# Patient Record
Sex: FEMALE | Race: WHITE
[De-identification: names, ages, dates, MRNs, and addresses within clinical notes are randomized per-mention and may not be internally consistent; named-entity substitution may affect disease eponyms.]

---

## 2020-01-14 ENCOUNTER — HOSPITAL ENCOUNTER (OUTPATIENT)
Dept: HOSPITAL 75 - RAD FS | Age: 50
End: 2020-01-14
Attending: NURSE PRACTITIONER
Payer: COMMERCIAL
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
## 2020-01-14 DIAGNOSIS — S49.91XA: Primary | ICD-10-CM

## 2020-01-14 PROCEDURE — 73030 X-RAY EXAM OF SHOULDER: CPT

## 2020-01-14 NOTE — DIAGNOSTIC IMAGING REPORT
INDICATION: Right shoulder pain after bowling injury.



COMPARISON: None available.



TECHNIQUE: Two views of the right shoulder were obtained.



FINDINGS: No fracture. Glenohumeral and acromioclavicular joints

are normal in alignment. Subacromial space is preserved. No

abnormal soft tissue mineralizations.



IMPRESSION: Normal right shoulder radiographs.



Dictated by: 



  Dictated on workstation # [id number]

## 2020-12-12 ENCOUNTER — HOSPITAL ENCOUNTER (EMERGENCY)
Dept: HOSPITAL 75 - ER FS | Age: 50
End: 2020-12-12
Payer: COMMERCIAL

## 2020-12-12 VITALS — WEIGHT: 243.61 LBS | HEIGHT: 66.02 IN | BODY MASS INDEX: 39.15 KG/M2

## 2020-12-12 VITALS — DIASTOLIC BLOOD PRESSURE: 82 MMHG | SYSTOLIC BLOOD PRESSURE: 112 MMHG

## 2020-12-12 DIAGNOSIS — N39.0: Primary | ICD-10-CM

## 2020-12-12 LAB
APTT PPP: (no result) S
BACTERIA #/AREA URNS HPF: (no result) /HPF
BILIRUB UR QL STRIP: (no result)
FIBRINOGEN PPP-MCNC: (no result) MG/DL
GLUCOSE UR STRIP-MCNC: NEGATIVE MG/DL
KETONES UR QL STRIP: (no result)
LEUKOCYTE ESTERASE UR QL STRIP: (no result)
NITRITE UR QL STRIP: POSITIVE
PH UR STRIP: 6 [PH] (ref 5–9)
PROT UR QL STRIP: (no result)
RBC #/AREA URNS HPF: (no result) /HPF
SP GR UR STRIP: 1.02 (ref 1.02–1.02)
SQUAMOUS #/AREA URNS HPF: (no result) /HPF
WBC #/AREA URNS HPF: (no result) /HPF

## 2020-12-12 PROCEDURE — 99283 EMERGENCY DEPT VISIT LOW MDM: CPT

## 2020-12-12 PROCEDURE — 87088 URINE BACTERIA CULTURE: CPT

## 2020-12-12 PROCEDURE — 87186 SC STD MICRODIL/AGAR DIL: CPT

## 2020-12-12 PROCEDURE — 81000 URINALYSIS NONAUTO W/SCOPE: CPT

## 2020-12-12 PROCEDURE — 87077 CULTURE AEROBIC IDENTIFY: CPT

## 2020-12-12 NOTE — ED GU-FEMALE
General


Chief Complaint:   - Urinary


Stated Complaint:  LOWER BACK PAIN/BURNING ON URINATION


Source:  patient





History of Present Illness


Date Seen by Provider:  Dec 12, 2020


Time Seen by Provider:  10:00


Initial Comments


50-year-old female presents with burning on urination for the past 3 days, 

associated low back discomfort.  Denies fever, chills, abdominal pain, nausea or

vomiting.  History of UTIs in the past.





Allergies and Home Medications


Allergies


Coded Allergies:  


     No Known Drug Allergies (Unverified , 12/12/20)





Patient Home Medication List


Home Medication List Reviewed:  Yes





Review of Systems


Review of Systems


Constitutional:  No chills, No fever, No malaise, No weakness


Respiratory:  no symptoms reported


Cardiovascular:  no symptoms reported


Gastrointestinal:  No abdominal pain, No nausea, No vomiting


Genitourinary:  burning, dysuria, frequency; denies hematuria; pain, urgency


Musculoskeletal:  back pain (b/l low back)





Past Medical-Social-Family Hx


Past Med/Social Hx:  Reviewed Nursing Past Med/Soc Hx


Patient Social History


Recent Foreign Travel:  No


Contact w/Someone Who Travel:  No





Physical Exam


Vital Signs





Vital Signs - First Documented








 12/12/20





 21:49


 


Temp 37.7


 


Pulse 117


 


Resp 18


 


B/P (MAP) 112/82 (92)


 


O2 Delivery Room Air





Capillary Refill :


Height, Weight, BMI


Height: '"


Weight: lbs. oz. kg;  BMI


Method:


General Appearance:  WD/WN, no apparent distress


Gastrointestinal:  non tender, soft, no organomegaly; No distended, No guarding,

No rebound, No tenderness


Back:  no CVA tenderness, no vertebral tenderness





Progress/Results/Core Measures


Suspected Sepsis


SIRS


Temperature: 


Pulse:  


Respiratory Rate: 


 


Blood Pressure  / 


Mean:





Results/Orders


Lab Results





Laboratory Tests








Test


 12/12/20


21:50 Range/Units


 


 


Urine Color DARK YELLOW   


 


Urine Clarity CLOUDY   


 


Urine pH 6.0  5-9  


 


Urine Specific Gravity 1.025 H 1.016-1.022  


 


Urine Protein 2+ H NEGATIVE  


 


Urine Glucose (UA) NEGATIVE  NEGATIVE  


 


Urine Ketones 2+ H NEGATIVE  


 


Urine Nitrite POSITIVE H NEGATIVE  


 


Urine Bilirubin 2+ H NEGATIVE  


 


Urine Urobilinogen >=8.0  < = 1.0  MG/DL


 


Urine Leukocyte Esterase 2+ H NEGATIVE  


 


Urine RBC (Auto) 3+ H NEGATIVE  


 


Urine RBC 0-2   /HPF


 


Urine WBC  H  /HPF


 


Urine Squamous Epithelial


Cells 2-5 


  /HPF





 


Urine Crystals NONE   /LPF


 


Urine Bacteria LARGE H  /HPF


 


Urine Casts NONE   /LPF


 


Urine Mucus SMALL H  /LPF


 


Urine Culture Indicated YES   








My Orders





Orders - SAVANA PHELAN DO


Urinalysis (12/12/20 21:45)


Urine Culture (12/12/20 21:50)





Vital Signs/I&O











 12/12/20





 21:49


 


Temp 37.7


 


Pulse 117


 


Resp 18


 


B/P (MAP) 112/82 (92)


 


O2 Delivery Room Air





Capillary Refill :





Departure


Impression





   Primary Impression:  


   Urinary tract infection


   Qualified Codes:  N30.01 - Acute cystitis with hematuria


Disposition:  01 HOME, SELF-CARE


Condition:  Stable





Departure-Patient Inst.


Decision time for Depature:  22:17


Referrals:  


[facility] (PCP)


Primary Care Physician








ANDER MCKEON (Family)


Primary Care Physician


Patient Instructions:  Urinary Tract Infection, Adult (DC)





Add. Discharge Instructions:  


Follow up with your PCP in 2-3 days if not improving, ER sooner if significantly

worse.





All discharge instructions reviewed with patient and/or family. Voiced 

understanding.


Scripts


Phenazopyridine HCl (Pyridium) 200 Mg Tablet


1 TAB PO TID for Discomfort, #6


   Prov: SAVANA PHELAN DO         12/12/20 


Sulfamethoxazole/Trimethoprim (Bactrim Ds Tablet) 1 Each Tablet


1 EACH PO BID for 7 Days, #14 TAB


   Prov: SAVANA PHELAN DO         12/12/20











SAVANA PHELAN DO         Dec 12, 2020 22:09